# Patient Record
Sex: FEMALE | URBAN - METROPOLITAN AREA
[De-identification: names, ages, dates, MRNs, and addresses within clinical notes are randomized per-mention and may not be internally consistent; named-entity substitution may affect disease eponyms.]

---

## 2017-03-23 ENCOUNTER — APPOINTMENT (OUTPATIENT)
Age: 22
Setting detail: DERMATOLOGY
End: 2017-03-23

## 2017-03-23 DIAGNOSIS — Z41.9 ENCOUNTER FOR PROCEDURE FOR PURPOSES OTHER THAN REMEDYING HEALTH STATE, UNSPECIFIED: ICD-10-CM

## 2017-03-23 PROCEDURE — ? LASER HAIR REMOVAL

## 2017-03-23 NOTE — PROCEDURE: LASER HAIR REMOVAL
Pre-Procedure: Prior to proceeding the treatment areas were cleaned and all present put on their eye protection.
Treatment Number: 0
Anesthesia Type: 1% lidocaine with epinephrine
Detail Level: Detailed
Laser Type: diode 810nm
Cooling: chill tip
Fluence (Will Not Render If 0): 20
Treatment Number: 15
Post-Procedure Care: Immediate endpoint: perifollicular erythema and edema. Vaseline and ice applied. Post care reviewed with patient.
Consent: Written consent obtained, risks reviewed including but not limited to crusting, scabbing, blistering, scarring, darker or lighter pigmentary change, paradoxical hair regrowth, incomplete removal of hair and infection.
Fluence (Will Not Render If 0): 42
Pulse Duration: auto
Spot Size: 12 mm
Post-Care Instructions: I reviewed with the patient in detail post-care instructions. Patient should avoid sun for a minimum of 4 weeks before and after treatment.
Render Post-Care In The Note: No
Fluence (Will Not Render If 0): 11
Spot Size: 9 mm

## 2017-06-08 ENCOUNTER — APPOINTMENT (OUTPATIENT)
Age: 22
Setting detail: DERMATOLOGY
End: 2017-06-08

## 2017-06-08 DIAGNOSIS — Z41.9 ENCOUNTER FOR PROCEDURE FOR PURPOSES OTHER THAN REMEDYING HEALTH STATE, UNSPECIFIED: ICD-10-CM

## 2017-06-08 PROCEDURE — ? DIAGNOSIS COMMENT

## 2017-06-08 PROCEDURE — ? LIGHTSHEER

## 2017-06-08 ASSESSMENT — LOCATION DETAILED DESCRIPTION DERM
LOCATION DETAILED: PHILTRUM
LOCATION DETAILED: LEFT CHIN

## 2017-06-08 ASSESSMENT — LOCATION ZONE DERM
LOCATION ZONE: LIP
LOCATION ZONE: FACE

## 2017-06-08 ASSESSMENT — LOCATION SIMPLE DESCRIPTION DERM
LOCATION SIMPLE: UPPER LIP
LOCATION SIMPLE: CHIN

## 2017-06-08 NOTE — PROCEDURE: LIGHTSHEER
Spot Size: 9 mm
Price (Use Numbers Only, No Special Characters Or $): 18
External Cooling Fan Speed: 0
Laser Type: Keyona CORNELIUS
Fluence: 12
Price (Use Numbers Only, No Special Characters Or $): 100
Skin Type (Optional): IV
Spot Size: 35 mm
Detail Level: Zone
Cooling: chill tip
Endpoint: Immediate endpoint: perifollicular erythema and edema. ice applied. Post care reviewed with patient.
Consent: Written consent obtained, risks reviewed including but not limited to crusting, scabbing, blistering, scarring, darker or lighter pigmentary change, paradoxical hair regrowth, incomplete removal of hair and infection.
Post-Care Instructions: I reviewed with the patient in detail post-care instructions. Patient should avoid sun for a minimum of 4 weeks before and after treatment.
Pulse Duration: auto
Endpoint: Immediate endpoint: perifollicular erythema and edema. Ice and Aloe Avel were applied. Post care reviewed with patient.
Fluence: 43
Laser Type: Keyona Juarez (vacuum assisted)

## 2017-09-15 ENCOUNTER — APPOINTMENT (OUTPATIENT)
Age: 22
Setting detail: DERMATOLOGY
End: 2017-09-15

## 2017-09-15 DIAGNOSIS — Z41.9 ENCOUNTER FOR PROCEDURE FOR PURPOSES OTHER THAN REMEDYING HEALTH STATE, UNSPECIFIED: ICD-10-CM

## 2017-09-15 PROCEDURE — ? LIGHTSHEER

## 2017-11-17 ENCOUNTER — APPOINTMENT (OUTPATIENT)
Age: 22
Setting detail: DERMATOLOGY
End: 2017-11-17

## 2017-11-17 DIAGNOSIS — Z41.9 ENCOUNTER FOR PROCEDURE FOR PURPOSES OTHER THAN REMEDYING HEALTH STATE, UNSPECIFIED: ICD-10-CM

## 2017-11-17 PROCEDURE — ? LASER HAIR REMOVAL

## 2017-11-17 PROCEDURE — ? DIAGNOSIS COMMENT

## 2017-11-17 NOTE — PROCEDURE: LASER HAIR REMOVAL
Detail Level: Simple
Post-Procedure Care: Immediate endpoint: perifollicular erythema and edema. Vaseline and ice applied. Post care reviewed with patient.
Treatment Number: 0
Price (Use Numbers Only, No Special Characters Or $): 170.00
Anesthesia Type: 1% lidocaine with epinephrine
Fluence (Will Not Render If 0): 20
Pre-Procedure: Prior to proceeding the treatment areas were cleaned and all present put on their eye protection.
Render Post-Care In The Note: No
Laser Type: diode 810nm
Fluence (Will Not Render If 0): 44
Consent: Written consent obtained, risks reviewed including but not limited to crusting, scabbing, blistering, scarring, darker or lighter pigmentary change, paradoxical hair regrowth, incomplete removal of hair and infection.
Treatment Number: 1
Post-Care Instructions: I reviewed with the patient in detail post-care instructions. Patient should avoid sun for a minimum of 4 weeks before and after treatment.

## 2018-01-10 ENCOUNTER — APPOINTMENT (OUTPATIENT)
Age: 23
Setting detail: DERMATOLOGY
End: 2018-01-10

## 2018-01-10 DIAGNOSIS — Z71.89 OTHER SPECIFIED COUNSELING: ICD-10-CM

## 2018-01-10 DIAGNOSIS — Z41.9 ENCOUNTER FOR PROCEDURE FOR PURPOSES OTHER THAN REMEDYING HEALTH STATE, UNSPECIFIED: ICD-10-CM

## 2018-01-10 PROCEDURE — ? LIGHTSHEER

## 2018-01-10 PROCEDURE — ? COUNSELING

## 2018-01-10 ASSESSMENT — LOCATION ZONE DERM: LOCATION ZONE: FACE

## 2018-01-10 ASSESSMENT — LOCATION DETAILED DESCRIPTION DERM: LOCATION DETAILED: LEFT INFERIOR CENTRAL MALAR CHEEK

## 2018-01-10 ASSESSMENT — LOCATION SIMPLE DESCRIPTION DERM: LOCATION SIMPLE: LEFT CHEEK

## 2018-01-10 NOTE — PROCEDURE: LIGHTSHEER
Spot Size: 9 mm
External Cooling Fan Speed: 0
Post-Care Instructions: I reviewed with the patient in detail post-care instructions. Patient should avoid sun for a minimum of 4 weeks before and after treatment.
Vacuum Level: medium
Fluence: 34
Fluence: 10
Detail Level: Simple
Hair Texture (Optional): mixed
Total Pulses: 28
Laser Type: Keyona Juarez (fine tip)
Cooling: chill tip
Hair Color (Optional): Black
Endpoint: Immediate endpoint: perifollicular erythema and edema. Aloe makenzie, SPF, and ice applied. Post care reviewed with patient.
Pulse Duration: 40 ms
Pulse Duration: 30 ms
Total Pulses: 8
Endpoint: Immediate endpoint: perifollicular erythema and edema. Aloe makenzie, SPF and ice applied. Post care reviewed with patient.
Price (Use Numbers Only, No Special Characters Or $): 70.00
Detail Level: Zone
Laser Type: LightSheer Infinity (vacuum assisted)
Spot Size: 35 mm
Consent: Verbal consent obtained, risks reviewed including but not limited to crusting, scabbing, blistering, scarring, darker or lighter pigmentary change, paradoxical hair regrowth, incomplete removal of hair and infection.
Hair Texture (Optional): coarse
Price (Use Numbers Only, No Special Characters Or $): 100.00

## 2018-03-12 ENCOUNTER — APPOINTMENT (OUTPATIENT)
Age: 23
Setting detail: DERMATOLOGY
End: 2018-03-12

## 2018-03-12 DIAGNOSIS — Z71.89 OTHER SPECIFIED COUNSELING: ICD-10-CM

## 2018-03-12 DIAGNOSIS — Z41.9 ENCOUNTER FOR PROCEDURE FOR PURPOSES OTHER THAN REMEDYING HEALTH STATE, UNSPECIFIED: ICD-10-CM

## 2018-03-12 PROCEDURE — ? LIGHTSHEER

## 2018-03-12 PROCEDURE — ? COUNSELING

## 2018-03-12 PROCEDURE — ? COSMETIC CONSULTATION: HAIR REMOVAL

## 2018-03-12 ASSESSMENT — LOCATION SIMPLE DESCRIPTION DERM
LOCATION SIMPLE: LEFT CHEEK
LOCATION SIMPLE: CHIN

## 2018-03-12 ASSESSMENT — LOCATION ZONE DERM: LOCATION ZONE: FACE

## 2018-03-12 ASSESSMENT — LOCATION DETAILED DESCRIPTION DERM
LOCATION DETAILED: RIGHT MENTAL CREASE
LOCATION DETAILED: LEFT INFERIOR CENTRAL MALAR CHEEK

## 2018-03-12 NOTE — PROCEDURE: LIGHTSHEER
Hair Texture (Optional): coarse
Fluence: 34
Detail Level: Zone
Spot Size: 35 mm
Laser Type: Keyona Juarez (fine tip)
External Cooling Fan Speed: 0
Endpoint: Patient tolerated well. Immediate endpoint: perifollicular erythema. Aloe makenzie and ice applied. Post care reviewed with patient.
Total Pulses: 6
Hair Color (Optional): Black
Price (Use Numbers Only, No Special Characters Or $): 170.00
Post-Care Instructions: I reviewed with the patient in detail post-care instructions. Patient should avoid sun for a minimum of 4 weeks before and after treatment.
Hair Texture (Optional): mixed
Spot Size: 9 mm
Pulse Duration: 30 ms
Consent: Written consent obtained, risks reviewed including but not limited to crusting, scabbing, blistering, scarring, darker or lighter pigmentary change, paradoxical hair regrowth, incomplete removal of hair and infection.
Cooling: chill tip
Vacuum Level: medium
Total Pulses: 86
Laser Type: Keyona Juarez (vacuum assisted)
Fluence: 10

## 2018-03-12 NOTE — HPI: COSMETIC (LASER HAIR REMOVAL)
previous_has_had_previous_treatment
When Was Your Last Laser Treatment?: 1/10/18
Additional History: Denies sun exposure x 1 month. Denies complications from previous treatment. States she noticed reduction in hair from last treatment and is happy with results.

## 2018-08-16 ENCOUNTER — APPOINTMENT (OUTPATIENT)
Age: 23
Setting detail: DERMATOLOGY
End: 2018-08-16

## 2018-08-16 DIAGNOSIS — Z41.9 ENCOUNTER FOR PROCEDURE FOR PURPOSES OTHER THAN REMEDYING HEALTH STATE, UNSPECIFIED: ICD-10-CM

## 2018-08-16 DIAGNOSIS — Z71.89 OTHER SPECIFIED COUNSELING: ICD-10-CM

## 2018-08-16 PROCEDURE — ? ALMA LASER

## 2018-08-16 PROCEDURE — ? COUNSELING

## 2018-08-16 ASSESSMENT — LOCATION SIMPLE DESCRIPTION DERM
LOCATION SIMPLE: LEFT LIP
LOCATION SIMPLE: UPPER LIP

## 2018-08-16 ASSESSMENT — LOCATION DETAILED DESCRIPTION DERM
LOCATION DETAILED: LEFT LOWER CUTANEOUS LIP
LOCATION DETAILED: PHILTRUM

## 2018-08-16 ASSESSMENT — LOCATION ZONE DERM: LOCATION ZONE: LIP

## 2018-08-16 NOTE — PROCEDURE: ALMA LASER
Er:Yag Post-Care Generic: Post care reviewed with patient.
Treatment Fluence In Mj: 10
Passes: 1
Pulse Mode (Optional): L
External Cooling Fan Speed: 5
Matrix Dots: 7 x 7
Consent: Written consent obtained from patient, risks reviewed including but not limited to crusting, scabbing, blistering, scarring, darker or lighter pigmentary change, systemic reactions, ulceration, incidental hair removal, bruising, and/or incomplete removal. Patient tolerated well. Ice and aloe makenzie applied post treatment
Frequency In Hz: 2
Energy (Mj/P): 600
Energy In Mj: 400
Spot Size In Mm: 3
Pulse Width: 1 sec
Power In Holliday: 50
Fluence (Mj/Cm2): 4
Power (Holliday): Low (10w)
Handpiece: Bipolar
Detail Level: Zone
Ipl Post-Care: Vaseline and ice applied. Post care reviewed with patient.
Pulse Type: V
Nd:Yag Post-Care: Immediate endpoint whitening and pinpoint bleeding. Vaseline and ice applied. Post care reviewed with patient.
Pulse Width: 30 ms
Post-Care Instructions: Patient tolerated well. Ice and aloe makenzie applied post treatment. I reviewed with the patient in detail post-care instructions. Patient should avoid sunlight and wear sun protection.
Frequency In Hz: Unibody
Post-Care To Use?: Generic
Clear Lift Post-Care: Post care reviewed with patient. Patient should avoid direct sunlight and wear broad spectrum sunscreen daily.
Fluence (J/Cm2): 30

## 2018-08-16 NOTE — HPI: COSMETIC (LASER HAIR REMOVAL)
previous_has_had_previous_treatment
Additional History: Patient denies sun exposure x 4 weeks. Denies complications from previous treatments.

## 2018-12-18 ENCOUNTER — APPOINTMENT (OUTPATIENT)
Age: 23
Setting detail: DERMATOLOGY
End: 2018-12-18

## 2018-12-18 DIAGNOSIS — Z71.89 OTHER SPECIFIED COUNSELING: ICD-10-CM

## 2018-12-18 DIAGNOSIS — Z41.9 ENCOUNTER FOR PROCEDURE FOR PURPOSES OTHER THAN REMEDYING HEALTH STATE, UNSPECIFIED: ICD-10-CM

## 2018-12-18 PROCEDURE — ? ALMA LASER

## 2018-12-18 PROCEDURE — ? COUNSELING

## 2018-12-18 ASSESSMENT — LOCATION DETAILED DESCRIPTION DERM
LOCATION DETAILED: LEFT CHIN
LOCATION DETAILED: PHILTRUM

## 2018-12-18 ASSESSMENT — LOCATION SIMPLE DESCRIPTION DERM
LOCATION SIMPLE: UPPER LIP
LOCATION SIMPLE: CHIN

## 2018-12-18 ASSESSMENT — LOCATION ZONE DERM
LOCATION ZONE: LIP
LOCATION ZONE: FACE

## 2018-12-18 NOTE — HPI: COSMETIC (LASER HAIR REMOVAL)
previous_has_had_previous_treatment
When Was Your Last Laser Treatment?: 08/2018
Additional History: Patient denies complications from previous treatment. Denies sun exposure x 4 weeks.

## 2018-12-18 NOTE — PROCEDURE: ALMA LASER
Beryl Post-Care: Immediate endpoint whitening and pinpoint bleeding. Vaseline and ice applied. Post care reviewed with patient.
Pulse Width: 1 sec
Energy (Mj/P): 1
Er:Yag Post-Care Tattoo: Post care reviewed with patient.
Pulse Width: 30 ms
Post-Care To Use?: Generic
Matrix Dots: 7 x 7
Power (Holliday): Low (10w)
External Cooling Fan Speed: 5
Frequency In Hz: 2
Energy (Mj/P): 600
Energy In Mj: 400
Device Serial Number (Optional): soprano ice
Pulse Mode (Optional): L
Pulse Duration In Ms: 10
Price (Use Numbers Only, No Special Characters Or $): 0
Spot Size In Mm: 4
Ipl Post-Care: Vaseline and ice applied. Post care reviewed with patient.
Detail Level: Zone
Post-Care Instructions: I reviewed with the patient in detail post-care instructions. Patient should avoid sunlight and wear sun protection.
Pulse Type: IV
Handpiece: Bipolar
Frequency In Hz: Unibody
Fluence (J/Cm2): 23
Clear Lift Post-Care: Post care reviewed with patient. Patient should avoid direct sunlight and wear broad spectrum sunscreen daily.
Consent: Written consent obtained, risks reviewed including but not limited to crusting, scabbing, blistering, scarring, darker or lighter pigmentary change, systemic reactions, ulceration, incidental hair removal, bruising, and/or incomplete removal.